# Patient Record
Sex: MALE | NOT HISPANIC OR LATINO | URBAN - METROPOLITAN AREA
[De-identification: names, ages, dates, MRNs, and addresses within clinical notes are randomized per-mention and may not be internally consistent; named-entity substitution may affect disease eponyms.]

---

## 2020-02-24 ENCOUNTER — IMPORTED ENCOUNTER (OUTPATIENT)
Dept: URBAN - METROPOLITAN AREA CLINIC 1 | Facility: CLINIC | Age: 60
End: 2020-02-24

## 2020-02-24 PROBLEM — H01.004: Noted: 2020-02-24

## 2020-02-24 PROBLEM — H04.123: Noted: 2020-02-24

## 2020-02-24 PROBLEM — E11.9: Noted: 2020-02-24

## 2020-02-24 PROBLEM — H01.001: Noted: 2020-02-24

## 2020-02-24 PROBLEM — H25.813: Noted: 2020-02-24

## 2020-02-24 PROBLEM — Z79.4: Noted: 2020-02-24

## 2020-02-24 PROCEDURE — 92004 COMPRE OPH EXAM NEW PT 1/>: CPT

## 2020-02-24 PROCEDURE — 92015 DETERMINE REFRACTIVE STATE: CPT

## 2020-02-24 NOTE — PATIENT DISCUSSION
1.  DM Type II (IDDM) without sign of diabetic retinopathy and no blot heme on dilated retinal examination today OU No Macular Edema -- Discussed the pathophysiology of diabetes and its effect on the eye and risk of blindness. Stressed the importance of strong glucose control. Advised of importance of at least yearly dilated examinations but to contact us immediately for any problems or concerns. 2. Cataract OU -- Observe for now without intervention. The patient was advised to contact us if any change or worsening of vision3. Dry Eyes OU -- Recommend ATs BID OU prn.4. Anterior Blepharitis OU -- Recommend daily hot compresses and lid scrubs. Mrx finalized. Letter to PCP. Return for an appointment in 1 year 27 with Dr. Angela Hill.

## 2022-04-02 ASSESSMENT — VISUAL ACUITY
OS_SC: J7
OS_CC: 20/20-2
OD_GLARE: 20/400
OD_CC: 20/20-1
OD_SC: J7
OS_GLARE: 20/400

## 2022-04-02 ASSESSMENT — TONOMETRY
OD_IOP_MMHG: 15
OS_IOP_MMHG: 15